# Patient Record
Sex: MALE | Race: WHITE | NOT HISPANIC OR LATINO | Employment: OTHER | URBAN - METROPOLITAN AREA
[De-identification: names, ages, dates, MRNs, and addresses within clinical notes are randomized per-mention and may not be internally consistent; named-entity substitution may affect disease eponyms.]

---

## 2022-09-01 ENCOUNTER — OFFICE VISIT (OUTPATIENT)
Dept: URGENT CARE | Facility: URGENT CARE | Age: 27
End: 2022-09-01
Payer: COMMERCIAL

## 2022-09-01 ENCOUNTER — HOSPITAL ENCOUNTER (EMERGENCY)
Facility: CLINIC | Age: 27
Discharge: HOME | End: 2022-09-01
Attending: EMERGENCY MEDICINE | Admitting: EMERGENCY MEDICINE
Payer: COMMERCIAL

## 2022-09-01 VITALS
SYSTOLIC BLOOD PRESSURE: 128 MMHG | WEIGHT: 150 LBS | HEART RATE: 91 BPM | RESPIRATION RATE: 18 BRPM | OXYGEN SATURATION: 99 % | DIASTOLIC BLOOD PRESSURE: 84 MMHG | TEMPERATURE: 98.2 F

## 2022-09-01 VITALS
SYSTOLIC BLOOD PRESSURE: 131 MMHG | HEART RATE: 110 BPM | DIASTOLIC BLOOD PRESSURE: 90 MMHG | OXYGEN SATURATION: 99 % | TEMPERATURE: 98.3 F | RESPIRATION RATE: 23 BRPM | WEIGHT: 150 LBS

## 2022-09-01 DIAGNOSIS — S81.812A LACERATION OF LEFT LOWER EXTREMITY, INITIAL ENCOUNTER: Primary | ICD-10-CM

## 2022-09-01 DIAGNOSIS — S81.812A LACERATION OF LEFT LOWER EXTREMITY, INITIAL ENCOUNTER: ICD-10-CM

## 2022-09-01 PROCEDURE — 90471 IMMUNIZATION ADMIN: CPT | Performed by: EMERGENCY MEDICINE

## 2022-09-01 PROCEDURE — 99284 EMERGENCY DEPT VISIT MOD MDM: CPT | Mod: 25 | Performed by: EMERGENCY MEDICINE

## 2022-09-01 PROCEDURE — 99207 PR NO CHARGE LOS: CPT | Performed by: NURSE PRACTITIONER

## 2022-09-01 PROCEDURE — 250N000011 HC RX IP 250 OP 636: Performed by: EMERGENCY MEDICINE

## 2022-09-01 PROCEDURE — 90715 TDAP VACCINE 7 YRS/> IM: CPT | Performed by: EMERGENCY MEDICINE

## 2022-09-01 PROCEDURE — 99282 EMERGENCY DEPT VISIT SF MDM: CPT | Mod: 25 | Performed by: EMERGENCY MEDICINE

## 2022-09-01 PROCEDURE — 12002 RPR S/N/AX/GEN/TRNK2.6-7.5CM: CPT | Performed by: EMERGENCY MEDICINE

## 2022-09-01 RX ORDER — INDOMETHACIN 50 MG/1
50 CAPSULE ORAL 2 TIMES DAILY WITH MEALS
Qty: 20 CAPSULE | Refills: 0 | Status: SHIPPED | OUTPATIENT
Start: 2022-09-01 | End: 2022-09-11

## 2022-09-01 RX ADMIN — CLOSTRIDIUM TETANI TOXOID ANTIGEN (FORMALDEHYDE INACTIVATED), CORYNEBACTERIUM DIPHTHERIAE TOXOID ANTIGEN (FORMALDEHYDE INACTIVATED), BORDETELLA PERTUSSIS TOXOID ANTIGEN (GLUTARALDEHYDE INACTIVATED), BORDETELLA PERTUSSIS FILAMENTOUS HEMAGGLUTININ ANTIGEN (FORMALDEHYDE INACTIVATED), BORDETELLA PERTUSSIS PERTACTIN ANTIGEN, AND BORDETELLA PERTUSSIS FIMBRIAE 2/3 ANTIGEN 0.5 ML: 5; 2; 2.5; 5; 3; 5 INJECTION, SUSPENSION INTRAMUSCULAR at 17:49

## 2022-09-01 NOTE — PROGRESS NOTES
"  HPI: Emelia Simon is an 26 year old male that presents to clinic after cutting self on the left thigh with a chainsaw 3 hours ago. He reports numbness of the left thigh. He denies dysfunction of the left leg. Patient denies any loss of strength to the leg. Denies dizziness or lightheadedness. He took aspirin after the incident. He went to ED initially and left since \" tried to forcefully put a mask\" on him. Patient not wearing mask during visit.        Physical Exam:  BP (!) 131/90   Pulse 110   Temp 98.3  F (36.8  C) (Tympanic)   Resp 23   Wt 68 kg (150 lb)   SpO2 99%     Physical Exam  Constitutional:       General: He is not in acute distress.     Appearance: He is not toxic-appearing or diaphoretic.   Skin:     Comments: Approximately 5 cm gaping deep laceration left lower thigh. Fresh and dried Blood covering lower leg   Neurological:      Mental Status: He is alert.      Gait: Gait abnormal.      Comments: Walking with limp favoring left leg        Assessment:     1. Laceration of left lower extremity, initial encounter        Plan:    He is offered mask during visit which he accepts. With deep gaping wound with numbness in left leg and active bleeding recommend further evaluation in emergency room as multiple layered sutures indicated. Patient agreeable and declines ambulance. Recommend no NSAID use which can thin blood. Patient is discharged in stable condition.     Herminia Rocha NP 3:37 PM    "

## 2022-09-01 NOTE — ED PROVIDER NOTES
History     Chief Complaint   Patient presents with     Laceration     HPI  Alfred Kapoor is a 26 year old male who presents with a laceration to his left lateral thigh.  Patient unfortunately was cut while he was chain sawing.  New blade.  Needs an update on his tetanus.  Went to an urgent care who was concerned about the possible depth of the injury requiring layered sutures.  The wound was not explored.  He did not receive any treatments other than dressing.  Was not updated on his tetanus that was done here.  He is able ambulate with mild discomfort.  Denies any paresthesias distally.  Did not involve the knee joint.    Allergies:  No Known Allergies    Problem List:    There are no problems to display for this patient.       Past Medical History:    History reviewed. No pertinent past medical history.    Past Surgical History:    History reviewed. No pertinent surgical history.    Family History:    History reviewed. No pertinent family history.    Social History:  Marital Status:  Single [1]  Social History     Tobacco Use     Smoking status: Current Every Day Smoker     Packs/day: 0.50     Types: Cigarettes     Smokeless tobacco: Never Used   Substance Use Topics     Alcohol use: Not Currently     Drug use: Yes     Types: Marijuana     Comment: dily        Medications:    No current outpatient medications on file.        Review of Systems all other systems are reviewed and are negative.    Physical Exam   BP: 128/84  Pulse: 91  Temp: 98.2  F (36.8  C)  Resp: 18  Weight: 68 kg (150 lb)  SpO2: 99 %      Physical Exam General alert cooperative male in moderate distress.  Examination of left lateral thigh reveals a linear 6 cm laceration into the subcu fat.  The muscle is intact.  No foreign bodies evident.  Knee joint is not involved.  CMS is intact distally.    ED Course          Patient had a field block of lidocaine with epinephrine instilled for anesthetic.  Wound was irrigated with normal saline.  Wound  was then closed with eight 4.0 Ethilon sutures in interrupted fashion.  Antibiotic and dressing were applied.       Procedures              Critical Care time:  none               No results found for this or any previous visit (from the past 24 hour(s)).    Medications   Tdap (tetanus-diphtheria-acell pertussis) (ADACEL) injection 0.5 mL (0.5 mLs Intramuscular Given 9/1/22 2878)       Assessments & Plan (with Medical Decision Making)   Alfred Kapoor is a 26 year old male who presents with a laceration to his left lateral thigh.  Patient unfortunately was cut while he was chain sawing.  New blade.  Needs an update on his tetanus.  Went to an urgent care who was concerned about the possible depth of the injury requiring layered sutures.  The wound was not explored.  He did not receive any treatments other than dressing.  Was not updated on his tetanus that was done here.  He is able ambulate with mild discomfort.  Denies any paresthesias distally.  Did not involve the knee joint.  Patient had a linear laceration described repaired as above.  CMS is intact distally.  Tetanus was updated.  Patient laceration care is provided.  Sutures out in 7 to 10 days.  Watch for secondary infection.  Indocin for pain.  I have reviewed the nursing notes.    I have reviewed the findings, diagnosis, plan and need for follow up with the patient.       New Prescriptions    No medications on file       Final diagnoses:   Laceration of left lower extremity, initial encounter       9/1/2022   [facility] EMERGENCY DEPT     Lorenzo Freeman MD  09/01/22 4951

## 2022-09-01 NOTE — ED TRIAGE NOTES
Laceration to the upper outer knee/thigh on the left leg, done with a chain saw, was seen in UC and they felt he needed double layering of sutures and they were not able to do that

## 2022-09-01 NOTE — DISCHARGE INSTRUCTIONS
Keep clean and covered.  Avoid activities that will stress this area or direct injury.  Avoid lifting heavy objects as that may put stress on the muscles under the laceration.  This may cause the stitches to dehisce or increased bleeding in the muscle.  You can take Indocin for pain.  Your tetanus was updated today.